# Patient Record
Sex: MALE | Race: WHITE | NOT HISPANIC OR LATINO | Employment: UNEMPLOYED | URBAN - METROPOLITAN AREA
[De-identification: names, ages, dates, MRNs, and addresses within clinical notes are randomized per-mention and may not be internally consistent; named-entity substitution may affect disease eponyms.]

---
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
## 2025-05-06 ENCOUNTER — HOSPITAL ENCOUNTER (EMERGENCY)
Facility: CLINIC | Age: 20
Discharge: HOME | End: 2025-05-06
Attending: NURSE PRACTITIONER | Admitting: NURSE PRACTITIONER
Payer: COMMERCIAL

## 2025-05-06 VITALS
SYSTOLIC BLOOD PRESSURE: 164 MMHG | TEMPERATURE: 98.6 F | RESPIRATION RATE: 16 BRPM | DIASTOLIC BLOOD PRESSURE: 87 MMHG | WEIGHT: 239.1 LBS | HEART RATE: 98 BPM | OXYGEN SATURATION: 99 %

## 2025-05-06 DIAGNOSIS — L03.115 CELLULITIS OF RIGHT LOWER EXTREMITY: ICD-10-CM

## 2025-05-06 PROCEDURE — 99283 EMERGENCY DEPT VISIT LOW MDM: CPT | Performed by: NURSE PRACTITIONER

## 2025-05-06 PROCEDURE — 99284 EMERGENCY DEPT VISIT MOD MDM: CPT | Performed by: NURSE PRACTITIONER

## 2025-05-06 RX ORDER — DOXYCYCLINE 100 MG/1
100 CAPSULE ORAL 2 TIMES DAILY
Qty: 20 CAPSULE | Refills: 0 | Status: SHIPPED | OUTPATIENT
Start: 2025-05-06 | End: 2025-05-16

## 2025-05-06 ASSESSMENT — COLUMBIA-SUICIDE SEVERITY RATING SCALE - C-SSRS
2. HAVE YOU ACTUALLY HAD ANY THOUGHTS OF KILLING YOURSELF IN THE PAST MONTH?: NO
6. HAVE YOU EVER DONE ANYTHING, STARTED TO DO ANYTHING, OR PREPARED TO DO ANYTHING TO END YOUR LIFE?: NO
1. IN THE PAST MONTH, HAVE YOU WISHED YOU WERE DEAD OR WISHED YOU COULD GO TO SLEEP AND NOT WAKE UP?: NO

## 2025-05-06 ASSESSMENT — ACTIVITIES OF DAILY LIVING (ADL): ADLS_ACUITY_SCORE: 41

## 2025-05-06 NOTE — ED PROVIDER NOTES
History     Chief Complaint   Patient presents with    Rash     HPI  Ranulfo Beach is a 19 year old male who presents for evaluation of rash/redness to his right medial calf.  Patient noticed a spot on his right lower calf a few days ago.  He was told he circled the area of redness which is now increased in size.  Warm to the touch.  He has scratched the area a few times, but is not exceptionally pruritic.  Denies any known insect bite or tick bite.  Denies fever or chills.  He does not feel ill.  He states he has an prior history of MRSA.      Allergies:  Allergies   Allergen Reactions    Cephalexin Hives     Developed hives four days after being on bactrim and keflex    Sulfamethoxazole-Trimethoprim Hives     Exanthematous maculopapular drug eruption. Strict avoidance of sulfa necessary (Allergy evaluation 5/3/22)       Problem List:    There are no active problems to display for this patient.       Past Medical History:    No past medical history on file.    Past Surgical History:    No past surgical history on file.    Family History:    No family history on file.    Social History:  Marital Status:  Single [1]        Medications:    diphenhydrAMINE (BENADRYL) 25 MG capsule  doxycycline hyclate (VIBRAMYCIN) 100 MG capsule  predniSONE (DELTASONE) 20 MG tablet  sulfamethoxazole-trimethoprim (BACTRIM DS) 800-160 MG tablet          Review of Systems  As mentioned above in the history present illness. All other systems were reviewed and are negative.    Physical Exam   BP: (!) 164/87  Pulse: 98  Temp: 98.6  F (37  C)  Resp: 16  Weight: 108.5 kg (239 lb 1.6 oz)  SpO2: 99 %      Physical Exam  Constitutional:       General: He is not in acute distress.     Appearance: Normal appearance. He is well-developed. He is not ill-appearing.   HENT:      Head: Normocephalic and atraumatic.      Right Ear: External ear normal.      Left Ear: External ear normal.      Nose: Nose normal.      Mouth/Throat:      Mouth: Mucous  membranes are moist.   Eyes:      Conjunctiva/sclera: Conjunctivae normal.   Cardiovascular:      Rate and Rhythm: Normal rate.   Pulmonary:      Effort: Pulmonary effort is normal.   Musculoskeletal:         General: Normal range of motion.      Right lower leg: No swelling or tenderness.        Legs:       Comments: Increased warmth to the area of erythema to the right medial calf.   Skin:     General: Skin is warm and dry.      Findings: Erythema (surrounding papular lesion to the right medial calf) and rash present. Rash is papular (lesion to medial left calf).          Neurological:      General: No focal deficit present.      Mental Status: He is alert and oriented to person, place, and time.         ED Course        Procedures            No results found for this or any previous visit (from the past 24 hours).    Medications - No data to display    Assessments & Plan (with Medical Decision Making)     Exam is consistent with cellulitis, surrounding a papular lesion suspicious for ingrown hair/folliculitis.  Patient be treated with doxycycline.  He is allergic to sulfa and cephalexin.  Instructed to recheck for any worsening.      Discharge Medication List as of 5/6/2025  2:37 PM        START taking these medications    Details   doxycycline hyclate (VIBRAMYCIN) 100 MG capsule Take 1 capsule (100 mg) by mouth 2 times daily for 10 days., Disp-20 capsule, R-0, E-Prescribe             Final diagnoses:   Cellulitis of right lower extremity       5/6/2025   [facility] EMERGENCY DEPT       Yadi, MONSERRAT Roach CNP  05/06/25 1528

## 2025-05-06 NOTE — DISCHARGE INSTRUCTIONS
Doxycycline 100 mg twice a day for 10 days.  Warm compresses few times a day.  Recheck if not improving in 3 to 4 days on the antibiotics or sooner for any worsening such as increased redness, increased swelling, or fevers.

## 2025-05-06 NOTE — ED TRIAGE NOTES
Pt presents with rash to right lower leg .Redness and spot. Pt has circled. Pt doesn't think he was bit by anything. Pt has had recent tattoos near this area.